# Patient Record
(demographics unavailable — no encounter records)

---

## 2025-01-31 NOTE — PLAN
[TextEntry] : Pt is neurologically intact.   She had no pain. Does have myelomalacia and severe cord compression.   Provider recommended excision of the mass via T1-T2 laminectomy with or without fusion.  Pt and  verbalized understanding and wish to proceed wish to proceed.   Pt verbalized understanding of the high risk of spinal cord stroke, spinal cord injury, paralysis, worsening neurological situation including, but not limited to, weakness, numbness, tingling, pins and needles, weakness, pain, paralysis, nerve injury, dural tear, infection, bleeding, heart attack, stroke, PE, DVT, high risk of dural tear, and need for reoperation.   Pt verbalized understanding of the above and wishes to proceed despite the risk.

## 2025-01-31 NOTE — HISTORY OF PRESENT ILLNESS
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 Accompanied by her . She was referred by Terrance Keys.

## 2025-01-31 NOTE — ADDENDUM
[FreeTextEntry1] :  ILydia assisted in documentation on 01/30/2025 acting as a scribe for Dr. Baron Alanis.

## 2025-01-31 NOTE — PHYSICAL EXAM
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

## 2025-01-31 NOTE — PHYSICAL EXAM
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

## 2025-01-31 NOTE — PHYSICAL EXAM
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

## 2025-01-31 NOTE — HISTORY OF PRESENT ILLNESS
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 Accompanied by her . She was referred by Terrance Keys.

## 2025-01-31 NOTE — END OF VISIT
[FreeTextEntry3] : . All medical record entries made by my scribe were at my, Dr. Baron Alanis, direction and personally dictated by me. I have reviewed the chart and agree that the record accurately reflects my personal performance of the history, physical exam, assessment and plan. I have also personally directed, reviewed, and agreed with the chart.

## 2025-01-31 NOTE — HISTORY OF PRESENT ILLNESS
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 Accompanied by her . She was referred by Terrance Keys.